# Patient Record
Sex: MALE | Race: BLACK OR AFRICAN AMERICAN | NOT HISPANIC OR LATINO | Employment: OTHER | URBAN - METROPOLITAN AREA
[De-identification: names, ages, dates, MRNs, and addresses within clinical notes are randomized per-mention and may not be internally consistent; named-entity substitution may affect disease eponyms.]

---

## 2023-11-25 ENCOUNTER — HOSPITAL ENCOUNTER (EMERGENCY)
Facility: HOSPITAL | Age: 68
Discharge: HOME | End: 2023-11-25
Attending: EMERGENCY MEDICINE
Payer: MEDICARE

## 2023-11-25 VITALS
RESPIRATION RATE: 16 BRPM | OXYGEN SATURATION: 96 % | BODY MASS INDEX: 22.46 KG/M2 | HEIGHT: 74 IN | SYSTOLIC BLOOD PRESSURE: 137 MMHG | WEIGHT: 175 LBS | DIASTOLIC BLOOD PRESSURE: 77 MMHG | HEART RATE: 84 BPM | TEMPERATURE: 98 F

## 2023-11-25 DIAGNOSIS — R07.9 CHEST PAIN: ICD-10-CM

## 2023-11-25 DIAGNOSIS — R06.02 SHORTNESS OF BREATH: ICD-10-CM

## 2023-11-25 LAB
ALBUMIN SERPL BCP-MCNC: 3.9 G/DL (ref 3.5–5.2)
ALP SERPL-CCNC: 86 U/L (ref 55–135)
ALT SERPL W/O P-5'-P-CCNC: 9 U/L (ref 10–44)
ANION GAP SERPL CALC-SCNC: 9 MMOL/L (ref 8–16)
ANISOCYTOSIS BLD QL SMEAR: SLIGHT
AST SERPL-CCNC: 17 U/L (ref 10–40)
BASOPHILS NFR BLD: 0 % (ref 0–1.9)
BILIRUB SERPL-MCNC: 0.3 MG/DL (ref 0.1–1)
BNP SERPL-MCNC: <10 PG/ML (ref 0–99)
BUN SERPL-MCNC: 11 MG/DL (ref 8–23)
CALCIUM SERPL-MCNC: 8.9 MG/DL (ref 8.7–10.5)
CHLORIDE SERPL-SCNC: 110 MMOL/L (ref 95–110)
CO2 SERPL-SCNC: 24 MMOL/L (ref 23–29)
CREAT SERPL-MCNC: 1.3 MG/DL (ref 0.5–1.4)
D DIMER PPP IA.FEU-MCNC: 0.29 MG/L FEU
DACRYOCYTES BLD QL SMEAR: ABNORMAL
DIFFERENTIAL METHOD: ABNORMAL
EOSINOPHIL NFR BLD: 1 % (ref 0–8)
ERYTHROCYTE [DISTWIDTH] IN BLOOD BY AUTOMATED COUNT: 15.3 % (ref 11.5–14.5)
EST. GFR  (NO RACE VARIABLE): >60 ML/MIN/1.73 M^2
GLUCOSE SERPL-MCNC: 77 MG/DL (ref 70–110)
HCT VFR BLD AUTO: 42.7 % (ref 40–54)
HGB BLD-MCNC: 14.2 G/DL (ref 14–18)
IMM GRANULOCYTES # BLD AUTO: ABNORMAL K/UL (ref 0–0.04)
IMM GRANULOCYTES NFR BLD AUTO: ABNORMAL % (ref 0–0.5)
LYMPHOCYTES NFR BLD: 50 % (ref 18–48)
MCH RBC QN AUTO: 30.7 PG (ref 27–31)
MCHC RBC AUTO-ENTMCNC: 33.3 G/DL (ref 32–36)
MCV RBC AUTO: 92 FL (ref 82–98)
MONOCYTES NFR BLD: 7 % (ref 4–15)
NEUTROPHILS NFR BLD: 41 % (ref 38–73)
NRBC BLD-RTO: 0 /100 WBC
PLATELET # BLD AUTO: 263 K/UL (ref 150–450)
PLATELET BLD QL SMEAR: ABNORMAL
PMV BLD AUTO: 10.8 FL (ref 9.2–12.9)
POTASSIUM SERPL-SCNC: 3.9 MMOL/L (ref 3.5–5.1)
PROMYELOCYTES NFR BLD MANUAL: 1 %
PROT SERPL-MCNC: 7.3 G/DL (ref 6–8.4)
RBC # BLD AUTO: 4.63 M/UL (ref 4.6–6.2)
SODIUM SERPL-SCNC: 143 MMOL/L (ref 136–145)
TARGETS BLD QL SMEAR: ABNORMAL
TROPONIN I SERPL DL<=0.01 NG/ML-MCNC: <0.006 NG/ML (ref 0–0.03)
WBC # BLD AUTO: 7.26 K/UL (ref 3.9–12.7)

## 2023-11-25 PROCEDURE — 85027 COMPLETE CBC AUTOMATED: CPT | Performed by: PHYSICIAN ASSISTANT

## 2023-11-25 PROCEDURE — 85007 BL SMEAR W/DIFF WBC COUNT: CPT | Performed by: PHYSICIAN ASSISTANT

## 2023-11-25 PROCEDURE — 93010 EKG 12-LEAD: ICD-10-PCS | Mod: ,,, | Performed by: GENERAL PRACTICE

## 2023-11-25 PROCEDURE — 85379 FIBRIN DEGRADATION QUANT: CPT | Performed by: NURSE PRACTITIONER

## 2023-11-25 PROCEDURE — 80053 COMPREHEN METABOLIC PANEL: CPT | Performed by: PHYSICIAN ASSISTANT

## 2023-11-25 PROCEDURE — 84484 ASSAY OF TROPONIN QUANT: CPT | Performed by: PHYSICIAN ASSISTANT

## 2023-11-25 PROCEDURE — 93005 ELECTROCARDIOGRAM TRACING: CPT

## 2023-11-25 PROCEDURE — 36415 COLL VENOUS BLD VENIPUNCTURE: CPT | Performed by: PHYSICIAN ASSISTANT

## 2023-11-25 PROCEDURE — 93010 ELECTROCARDIOGRAM REPORT: CPT | Mod: ,,, | Performed by: GENERAL PRACTICE

## 2023-11-25 PROCEDURE — 99285 EMERGENCY DEPT VISIT HI MDM: CPT | Mod: 25

## 2023-11-25 PROCEDURE — 83880 ASSAY OF NATRIURETIC PEPTIDE: CPT | Performed by: NURSE PRACTITIONER

## 2023-11-25 NOTE — FIRST PROVIDER EVALUATION
" Emergency Department TeleTriage Encounter Note      CHIEF COMPLAINT    Chief Complaint   Patient presents with    Shortness of Breath     X 4 - 6 months / asbestos concerns        VITAL SIGNS   Initial Vitals [11/25/23 1617]   BP Pulse Resp Temp SpO2   (!) 188/86 80 20 98.2 °F (36.8 °C) 98 %      MAP       --            ALLERGIES    Review of patient's allergies indicates:   Allergen Reactions    Lisinopril        PROVIDER TRIAGE NOTE  This is a teletriage evaluation of a 68 y.o. male presenting to the ED complaining of shortness of breath. Patient reports gradually worsening shortness of breath over the last 6 months. He has had "slight" chest pain for the past 2 days.     Patient is alert and oriented. He speaks in complete sentences. He is sitting upright in the chair in no distress.     Initial orders will be placed and care will be transferred to an alternate provider when patient is roomed for a full evaluation. Any additional orders and the final disposition will be determined by that provider.         ORDERS  Labs Reviewed   CBC W/ AUTO DIFFERENTIAL   COMPREHENSIVE METABOLIC PANEL   TROPONIN I       ED Orders (720h ago, onward)      Start Ordered     Status Ordering Provider    11/25/23 1623 11/25/23 1622  CBC Auto Differential  STAT         Pending Collection AG HART    11/25/23 1623 11/25/23 1622  Comprehensive Metabolic Panel  STAT         Pending Collection AG HART    11/25/23 1623 11/25/23 1622  Pulse Oximetry Continuous  Continuous         Ordered AG HART GRicarda    11/25/23 1623 11/25/23 1622  Cardiac Monitoring - Adult  Continuous         Ordered AG HART    11/25/23 1623 11/25/23 1622  EKG 12-lead  Once         Ordered AG HART    11/25/23 1623 11/25/23 1622  X-Ray Chest PA And Lateral  1 time imaging         Ordered AG HART    11/25/23 1623 11/25/23 1622  Troponin I  STAT         Pending Collection AG HART              Virtual Visit Note: The provider " triage portion of this emergency department evaluation and documentation was performed via [employer], a HIPAA-compliant telemedicine application, in concert with a tele-presenter in the room. A face to face patient evaluation with one of my colleagues will occur once the patient is placed in an emergency department room.      DISCLAIMER: This note was prepared with [employer] voice recognition transcription software. Garbled syntax, mangled pronouns, and other bizarre constructions may be attributed to that software system.

## 2023-11-26 NOTE — ED PROVIDER NOTES
Encounter Date: 11/25/2023       History     Chief Complaint   Patient presents with    Shortness of Breath     X 4 - 6 months / asbestos concerns      Patient is a 68 y.o. male who presents to the ED 11/25/2023 with a chief complaint of worsening shortness of breath and chest pain for several weeks.  He states it is worse with taking a deep breath and he is having difficulty climbing the stairs at his previous pace.  Of breath and chest pain are also worse with activity.  He states it also occurs at rest however.  States he was exposed to asbestos many many years ago and feels it may be catching up to him.  He also states he has been smoking cigarettes on and off for 40 years.  He denies any wheezing.  He denies any orthopnea.  He denies any lower extremity edema.  He denies any recent fever.  He states he has a chronic cough.  He states he does not take any medications and does not have any medical problems that he knows of.  He states he does not use drugs or drink alcohol.             Review of patient's allergies indicates:   Allergen Reactions    Lisinopril      No past medical history on file.  No past surgical history on file.  No family history on file.     Review of Systems   Constitutional:  Negative for chills and fever.   HENT:  Negative for sore throat.    Respiratory:  Positive for shortness of breath. Negative for chest tightness.    Cardiovascular:  Positive for chest pain. Negative for palpitations and leg swelling.   Gastrointestinal:  Negative for abdominal pain.   Genitourinary:  Negative for dysuria.   Musculoskeletal:  Negative for arthralgias and myalgias.   Skin:  Negative for rash and wound.   Neurological:  Negative for syncope.   Hematological:  Does not bruise/bleed easily.       Physical Exam     Initial Vitals [11/25/23 1617]   BP Pulse Resp Temp SpO2   (!) 188/86 80 20 98.2 °F (36.8 °C) 98 %      MAP       --         Physical Exam    Nursing note and vitals reviewed.  Constitutional:  He appears well-developed and well-nourished.   HENT:   Head: Normocephalic and atraumatic.   Eyes: Conjunctivae are normal. Pupils are equal, round, and reactive to light. Right eye exhibits no discharge. Left eye exhibits no discharge.   Neck: Neck supple.   Normal range of motion.  Cardiovascular:  Normal rate, regular rhythm, normal heart sounds and intact distal pulses.           Pulmonary/Chest: Breath sounds normal.   Abdominal: Abdomen is soft. Bowel sounds are normal.   Musculoskeletal:         General: Normal range of motion.      Cervical back: Normal range of motion and neck supple.     Neurological: He is alert and oriented to person, place, and time. He has normal strength. No sensory deficit.   Skin: Skin is warm and dry.   Psychiatric: He has a normal mood and affect.         ED Course   Procedures  Labs Reviewed   CBC W/ AUTO DIFFERENTIAL - Abnormal; Notable for the following components:       Result Value    RDW 15.3 (*)     Lymph % 50.0 (*)     All other components within normal limits   COMPREHENSIVE METABOLIC PANEL - Abnormal; Notable for the following components:    ALT 9 (*)     All other components within normal limits   TROPONIN I   B-TYPE NATRIURETIC PEPTIDE   D DIMER, QUANTITATIVE     EKG Readings: (Independently Interpreted)   Initial Reading: No STEMI. Rhythm: Normal Sinus Rhythm. Heart Rate: 77. Ectopy: No Ectopy. Conduction: Normal. ST Segments: Normal ST Segments. T Waves: Normal. Clinical Impression: Normal Sinus Rhythm   Reviewed by Dr. Sommers as well.       Imaging Results              X-Ray Chest PA And Lateral (Final result)  Result time 11/25/23 16:52:54      Final result by Tiera Dias MD (11/25/23 16:52:54)                   Impression:      Very mild right apical stranding suggesting scarring with no confluent infiltrate.  No old studies for comparison.      Electronically signed by: Tiera Dias MD  Date:    11/25/2023  Time:    16:52               Narrative:     EXAMINATION:  XR CHEST PA AND LATERAL    CLINICAL HISTORY:  shortness of breath;    TECHNIQUE:  PA and lateral views of the chest were performed.    COMPARISON:  None    FINDINGS:  The heart is not enlarged.  There is very slight stranding right medial apical likely representing slight scarring.  No confluent infiltrate.  No pleural effusion.                                       Medications - No data to display  Medical Decision Making  Amount and/or Complexity of Data Reviewed  Labs: ordered.      Additional MDM:   Heart Score:    History:          Slightly suspicious.  ECG:             Normal  Age:               >65 years  Risk factors: 1-2 risk factors  Troponin:       Less than or equal to normal limit  Heart Score = 3          APC / Resident Notes:   Patient is a 68 y.o. male who presents to the ED 11/25/2023 who underwent emergent evaluation for Worsening shortness of breath and chest pain over several weeks.  EKG without evidence of ischemia.  Troponin normal.  Score of 3.  I do not think ACS.  Patient is a chronic smoker.  Likely undiagnosed COPD.  He is not wheezing however.  I do recommend follow-up with pulmonology.  There is no sign of any acute infectious process such as pneumonia.  D-dimer normal.  I do not think PE.  I do not think other emergent process such as dissection.  Bi breath sounds clear respirations and even and non labored.  Oxygen saturation 96% on room air.  Labs unremarkable.  BNP normal.  No evidence of CHF.  I believe patient is stable for outpatient follow-up with cardiology and pulmonology. Based on my clinical evaluation, I do not appreciate any immediate, emergent, or life threatening condition or etiology that warrants additional workup today and feel that the patient can be discharged with close follow up care. Case discussed with Dr. Sommers who is agreeable to plan of care. Follow up and return precautions discussed; patient verbalized understanding and is agreeable to plan of  care. Patient discharged home in stable condition.          Attending Attestation:     Physician Attestation Statement for NP/PA:   I have directed and reviewed the workup performed by the PA/NP.  I performed the substantive portion of the medical decision making.     Other NP/PA Attestation Additions:    History of Present Illness: 68-year-old male presents with chest pain and shortness of breath.    Medical Decision Making: Initial differential diagnosis included but not limited to pulmonary embolism, pneumonia, and chronic lung disease.  The patient's x-ray showed no acute abnormalities per Radiology.  The patient's labs were significant for a negative troponin and negative D-dimer.  The patient's EKG showed no acute abnormalities per my independent interpretation.  I am in agreement with the nurse practitioner's assessment, treatment, and plan of care.                        Medical Decision Making:   Differential Diagnosis:   PE  ACS  Pneumonia          Clinical Impression:  Final diagnoses:  [R06.02] Shortness of breath  [R07.9] Chest pain          ED Disposition Condition    Discharge Stable          ED Prescriptions    None       Follow-up Information       Follow up With Specialties Details Why Contact Info Additional Information    [facility]  In 3 days  [address] [facility] [address]  [phone]       Counts include 234 beds at the [facility] Emergency Medicine  As needed, If symptoms worsen [address] [place] [address] [address] [place] [id number] 1st floor    Salvatore Segovia MD Cardiology, Interventional Cardiology In 1 week  [address] [facility] [address]  [phone]       Sapphire Garcia MD Pulmonary Disease, Critical Care Medicine In 1 week  [address] [facility]  [address]  [facility] [address]  [phone]                Gina Nicholson NP  11/25/23 1932       Bill Sommers MD  11/25/23 1940